# Patient Record
Sex: MALE | Race: WHITE | NOT HISPANIC OR LATINO
[De-identification: names, ages, dates, MRNs, and addresses within clinical notes are randomized per-mention and may not be internally consistent; named-entity substitution may affect disease eponyms.]

---

## 2017-02-27 PROBLEM — Z00.00 ENCOUNTER FOR PREVENTIVE HEALTH EXAMINATION: Status: ACTIVE | Noted: 2017-02-27

## 2019-06-16 ENCOUNTER — TRANSCRIPTION ENCOUNTER (OUTPATIENT)
Age: 64
End: 2019-06-16

## 2019-08-08 ENCOUNTER — TRANSCRIPTION ENCOUNTER (OUTPATIENT)
Age: 64
End: 2019-08-08

## 2023-09-14 ENCOUNTER — OFFICE (OUTPATIENT)
Dept: URBAN - METROPOLITAN AREA CLINIC 113 | Facility: CLINIC | Age: 68
Setting detail: OPHTHALMOLOGY
End: 2023-09-14
Payer: MEDICARE

## 2023-09-14 ENCOUNTER — RX ONLY (RX ONLY)
Age: 68
End: 2023-09-14

## 2023-09-14 DIAGNOSIS — Z96.1: ICD-10-CM

## 2023-09-14 DIAGNOSIS — H35.033: ICD-10-CM

## 2023-09-14 PROCEDURE — 92014 COMPRE OPH EXAM EST PT 1/>: CPT | Performed by: OPHTHALMOLOGY

## 2023-09-14 PROCEDURE — 92250 FUNDUS PHOTOGRAPHY W/I&R: CPT | Performed by: OPHTHALMOLOGY

## 2023-09-14 ASSESSMENT — KERATOMETRY
OS_K2POWER_DIOPTERS: 45.25
OS_K1POWER_DIOPTERS: 44.75
OD_K2POWER_DIOPTERS: 45.00
OD_AXISANGLE_DEGREES: 068
OS_AXISANGLE_DEGREES: 153
OD_K1POWER_DIOPTERS: 44.00

## 2023-09-14 ASSESSMENT — REFRACTION_AUTOREFRACTION
OS_CYLINDER: -1.00
OD_SPHERE: +0.25
OD_AXIS: 132
OD_CYLINDER: -0.75
OS_SPHERE: +0.50
OS_AXIS: 077

## 2023-09-14 ASSESSMENT — SPHEQUIV_DERIVED
OD_SPHEQUIV: -0.125
OS_SPHEQUIV: -0.25
OD_SPHEQUIV: 0
OS_SPHEQUIV: 0
OD_SPHEQUIV: -6.5

## 2023-09-14 ASSESSMENT — AXIALLENGTH_DERIVED
OS_AL: 23.1469
OD_AL: 23.2775
OS_AL: 23.0535
OD_AL: 25.9758
OD_AL: 23.2302

## 2023-09-14 ASSESSMENT — REFRACTION_MANIFEST
OD_CYLINDER: -1.00
OS_VA1: 20/40+
OD_SPHERE: +0.25
OD_SPHERE: -6.00
OS_AXIS: 105
OS_SPHERE: +0.50
OS_VA1: 20/25+2
OS_SPHERE: PLANO
OD_CYLINDER: -0.50
OS_CYLINDER: -0.75
OD_VA1: 20/150
OD_AXIS: 165
OD_VA1: 20/20
OS_CYLINDER: -1.50
OD_AXIS: 109
OS_AXIS: 098

## 2023-09-14 ASSESSMENT — CONFRONTATIONAL VISUAL FIELD TEST (CVF)
OS_FINDINGS: FULL
OD_FINDINGS: FULL

## 2023-09-14 ASSESSMENT — VISUAL ACUITY
OD_BCVA: 20/30-1
OS_BCVA: 20/25

## 2023-09-14 ASSESSMENT — TONOMETRY
OD_IOP_MMHG: 17
OS_IOP_MMHG: 17

## 2023-12-29 ENCOUNTER — NON-APPOINTMENT (OUTPATIENT)
Age: 68
End: 2023-12-29

## 2024-01-11 ENCOUNTER — NON-APPOINTMENT (OUTPATIENT)
Age: 69
End: 2024-01-11

## 2024-01-11 ENCOUNTER — APPOINTMENT (OUTPATIENT)
Dept: ORTHOPEDIC SURGERY | Facility: CLINIC | Age: 69
End: 2024-01-11
Payer: MEDICARE

## 2024-01-11 VITALS — BODY MASS INDEX: 31.08 KG/M2 | WEIGHT: 250 LBS | HEIGHT: 75 IN

## 2024-01-11 PROCEDURE — 99204 OFFICE O/P NEW MOD 45 MIN: CPT

## 2024-01-11 NOTE — DISCUSSION/SUMMARY
[de-identified] :  Today I had a lengthy discussion with the patient regarding their left heel spur pain.I have addressed all the patient's concerns surrounding the pathology of their condition.   At this time I would like to obtain advanced imaging of the patient's left ankle. An MRI was ordered so I can find out more about the etiology of the patient's condition. The patient should follow up with the office after obtaining the MRI.  I recommend that the patient utilize ice, NSAIDS PRN, and heat. They can also elevate their left ankle above the level of the heart.  The patient understood and verbally agreed to the treatment plan. All of their questions were answered and they were satisfied with the visit. The patient should call the office if they have any questions or experience worsening symptoms.

## 2024-01-11 NOTE — HISTORY OF PRESENT ILLNESS
[FreeTextEntry1] : The patient is a 68-year-old male who presents to the office with a left heel spur. He injured his foot two months ago after he started a new exercise regiment. He went to a [facility] walk-in in [place] after the injury and they reported that the pt has a left heel spur. He notes that his foot was operated on when he was 15 years old due to a torn achilles. He reports that he has mild pain in his left heel. The patient presents to the office in dress shoes and ambulating without assistance.

## 2024-01-11 NOTE — PHYSICAL EXAM
[de-identified] : Left ankle Physical Examination:  ++ plantar medial squeeze positive General: Alert and oriented x3.  In no acute distress.  Pleasant in nature with a normal affect.  No apparent respiratory distress.  Erythema, Warmth, Rubor: Negative Swelling: Negative  ROM: 1. Dorsiflexion: 10 degrees 2. Plantarflexion: 40 degrees 3. Inversion: 30 degrees 4. Eversion: 20 degrees  Tenderness to Palpation:  1. Lateral Malleolus: Negative 2. Medial Malleolus: Negative 3. Proximal Fibular Pain: Negative 4. Heel Pain: Negative 5. Cuboid: Negative 6. Navicular: Negative 7. Tibiotalar Joint: Negative 8. Subtalar Joint: Negative 9. Posterior Recess: Negative  Tendon Pain: 1. Achilles: Negative 2. Peroneals: Negative 3. Posterior Tibialis: Negative 4. Tibialis Anterior: Negative  Ligament Pain: 1. ATFL: Negative 2. CFL: Negative  3. PTFL: Negative 4. Deltoid Ligaments: Negative 5. Lis Franc Ligament: Negative  Stability:  1. Anterior Drawer: Negative 2. Posterior Drawer: Negative  Strength: 5/5 TA/GS/EHL  Pulses: 2+ DP/PT Pulses  Neuro: Intact motor and sensory  Additional Test: 1. Calcaneal Squeeze Test: positive 2. Syndesmosis Squeeze Test: Negative [de-identified] : 2 views x-rays calcaneus reviewed, 1/11/2024: Heel spur noted.  No fracture seen.

## 2024-01-11 NOTE — ADDENDUM
[FreeTextEntry1] : I, Faraz Whitehead, acted solely as a scribe for Dr. Faraz Aguillon on this date 01/11/2024  .   All medical record entries made by the Scribe were at my, Dr. Faraz Aguiloln, direction and personally dictated by me on 01/11/2024 . I have reviewed the chart and agree that the record accurately reflects my personal performance of the history, physical exam, assessment and plan. I have also personally directed, reviewed, and agreed with the chart.

## 2024-01-21 ENCOUNTER — OUTPATIENT (OUTPATIENT)
Dept: OUTPATIENT SERVICES | Facility: HOSPITAL | Age: 69
LOS: 1 days | End: 2024-01-21
Payer: MEDICARE

## 2024-01-21 ENCOUNTER — APPOINTMENT (OUTPATIENT)
Dept: MRI IMAGING | Facility: CLINIC | Age: 69
End: 2024-01-21
Payer: MEDICARE

## 2024-01-21 DIAGNOSIS — M72.2 PLANTAR FASCIAL FIBROMATOSIS: ICD-10-CM

## 2024-01-21 PROCEDURE — 73721 MRI JNT OF LWR EXTRE W/O DYE: CPT

## 2024-01-21 PROCEDURE — 73721 MRI JNT OF LWR EXTRE W/O DYE: CPT | Mod: 26,LT,MH

## 2024-01-26 ENCOUNTER — APPOINTMENT (OUTPATIENT)
Dept: ORTHOPEDIC SURGERY | Facility: CLINIC | Age: 69
End: 2024-01-26
Payer: MEDICARE

## 2024-01-26 DIAGNOSIS — M21.42 FLAT FOOT [PES PLANUS] (ACQUIRED), RIGHT FOOT: ICD-10-CM

## 2024-01-26 DIAGNOSIS — M72.2 PLANTAR FASCIAL FIBROMATOSIS: ICD-10-CM

## 2024-01-26 DIAGNOSIS — M21.41 FLAT FOOT [PES PLANUS] (ACQUIRED), RIGHT FOOT: ICD-10-CM

## 2024-01-26 PROCEDURE — 99214 OFFICE O/P EST MOD 30 MIN: CPT

## 2024-01-26 RX ORDER — DICLOFENAC SODIUM 1% 10 MG/G
1 GEL TOPICAL
Qty: 1 | Refills: 0 | Status: ACTIVE | COMMUNITY
Start: 2024-01-26 | End: 1900-01-01

## 2024-01-26 NOTE — HISTORY OF PRESENT ILLNESS
[FreeTextEntry1] : 1/26/2024: The patient is a 68-year-old male who presents to the office for follow-up of his left heel pain and to review the MRI of his left ankle.  He continues to have pain on the bottom portion of his heel consistent with severe plantar fasciitis.  He presents wearing regular sneakers with over-the-counter orthotics in the shoes.  He does have pain with every step he takes.  No other complaints.  1/11/2024: The patient is a 68-year-old male who presents to the office with a left heel spur. He injured his foot two months ago after he started a new exercise regiment. He went to a [facility] walk-in in [place] after the injury and they reported that the pt has a left heel spur. He notes that his foot was operated on when he was 15 years old due to a torn achilles. He reports that he has mild pain in his left heel. The patient presents to the office in dress shoes and ambulating without assistance.

## 2024-01-26 NOTE — PHYSICAL EXAM
[de-identified] : Left foot Physical Examination:  General: Alert and oriented x3.  In no acute distress.  Pleasant in nature with a normal affect.  No apparent respiratory distress.  Erythema, Warmth, Rubor: Negative Swelling: Negative  ROM Ankle: 1. Dorsiflexion: 10 degrees 2. Plantarflexion: 40 degrees 3. Inversion: 30 degrees 4. Eversion: 20 degrees  ROM of digits: Normal  Pes Planus: Negative Pes Cavus: Negative  Bunion: Negative Tailor's Bunion (Bunionette): Negative Hammer Toe Deformity/Deformities: Negative  Tenderness to Palpation:  1. Heel Pain: Negative 2. Midfoot Pain: Negative 3. First MTP Joint: Negative 4. Lis Franc Joint: Negative  Tenderness Metatarsals: 1st MT: Negative 2nd MT: Negative 3rd MT: Negative 4th MT: Negative 5th MT: Negative Base of the 5th MT: Negative  Ligament Pain: 1. Lis Franc Ligament: Negative 2. Plantar Fascia Ligament: Positive  Strength:  5/5 TA/GS/EHL/FHL/EDL/ADD/ABD  Pulses: 2+ DP/PT Pulses  Capillary Refill Toes: <2 seconds  Neuro: Intact motor and sensory throughout  Additional Test: 1. Argueta's Squeeze Test: Negative 2. Calcaneal Squeeze Test: Negative [de-identified] : EXAM: [id number] - MR ANKLE LT  - ORDERED BY:  ALEJANDRINA BIGGS   PROCEDURE DATE:  01/21/2024    INTERPRETATION:  CLINICAL INFORMATION: Left heel pain, spur, positive calcaneal squeeze test  TECHNIQUE: Multiplanar, multisequence MRI was obtained of the left ankle.  COMPARISON: X-ray left foot 12/30/2023  FINDINGS:  MUSCLES AND TENDONS: Tenosynovitis of the flexor hallucis longus and flexor digitorum longus. Anterior tibialis tendon is intact. There is a chronic longitudinal tear of the peroneus brevis tendon extending from the level of the retromalleolar groove with distal reconstitution along the lateral wall of the calcaneus and normal insertion on the base of the fifth metatarsal. Peroneus longus tendon is intact. Anterior extensor tendons are preserved. Mild tendinosis of the Achilles tendon with mild surrounding peritendinous edema. There is mild feathery edema involving the distal soleal myotendinous junction. There is a low-lying myotendinous junction of the soleus muscle which is asymmetric to the medial aspect. LIGAMENTS: There is chronic thickening and scar remodeling of the anterior tibiofibular ligament without acute injury. Posterior tibiofibular ligament is intact. There is chronic thinning and attenuation of the anterior talofibular ligament likely related to the sequela of remote injury. There is chronic thickening and scar remodeling of the calcaneofibular ligament. There is chronic scar remodeling within the deep and superficial fibers of the deltoid ligament. Chronic appearing ossicles are seen within the substance of the deep fibers of the deltoid ligament consistent with the sequela of remote avulsive injury. The spring ligamentous complex is grossly intact. CARTILAGE and SUBCHONDRAL BONE: There is mild to moderate arthrosis at the navicular/middle cuneiform articulation. Mild to moderate second tarsometatarsal joint arthrosis. SYNOVIUM/JOINT FLUID: No significant tibiotalar joint fluid. MARROW: Moderate edema in the inferior calcaneal spur. No acute fracture or osteonecrosis. PLANTAR FASCIA: The origin of the central cord of the plantar fascia is diffusely thickened and edematous with intrasubstance and perifascial edema. Foci of intrasubstance tearing are seen. There is a plantar calcaneal spur. Stress reaction is seen along the adjacent calcaneus. There is a small plantar fibroma within the plantar fascia at the level of the first tarsometatarsal articulation measuring up to 0.5 cm. NEUROVASCULAR STRUCTURES: Unremarkable. SINUS TARSI: Unremarkable. PERIPHERAL/ SUB-CUTANEOUS SOFT TISSUES: Moderate tissue edema at the subcutaneous tissue superficial to the calcaneus.  IMPRESSION:  Severe plantar fasciitis with osseous stress reaction at the calcaneal origin. Stress reaction at the calcaneal origin.  Tenosynovitis of the flexor hallucis longus and flexor digitorum longus.  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  Mild Achilles tendinosis with surrounding peritendinous edema.  Evidence of remote medial and lateral ligamentous injury as outlined above.  Mild to moderate arthrosis of the navicular cuneiform and second tarsometatarsal joints.  Small plantar fibroma at the level of the first tarsometatarsal articulation.  --- End of Report ---      FLORENCIO ANGELA MD; Resident Radiologist This document has been electronically signed. ILEANA JONES MD; Attending Radiologist This document has been electronically signed. Jan 24 2024 11:11AM

## 2024-01-26 NOTE — ADDENDUM
[FreeTextEntry1] : I, Faraz Whitehead, acted solely as a scribe for Dr. Faraz Aguillon on this date 01/11/2024  .   All medical record entries made by the Scribe were at my, Dr. Faraz Aguillon, direction and personally dictated by me on 01/11/2024 . I have reviewed the chart and agree that the record accurately reflects my personal performance of the history, physical exam, assessment and plan. I have also personally directed, reviewed, and agreed with the chart.

## 2024-01-26 NOTE — DISCUSSION/SUMMARY
[de-identified] : Assessment: Left foot Plantar Fasciitis.  Plan: #1. Anti-inflammatories/Tylenol as needed for pain. #2. Home stretching program/physical therapy prescription given. #3. Dr. Manriquez's insoles/gel heel cups. #4. Epsom Salt Baths daily. #5. Dorsal Night Splint.  Use as instructed/as directed.  #4. Follow up in 6-8 weeks as needed. #5. All questions answered.  The patient understood the treatment plan above.  Ultrasound-guided steroid injection prescription given for left foot plantar fascia injection to be done at our radiology facility if the patient would like to proceed with the injection.  Custom orthotics prescribed.  MRI reviewed in great details and all questions answered about the left ankle MRI.  The patient understood and verbally agreed to the treatment plan. All of their questions were answered and they were satisfied with the visit. The patient should call the office if they have any questions or experience worsening symptoms.

## 2024-02-26 ENCOUNTER — OUTPATIENT (OUTPATIENT)
Dept: OUTPATIENT SERVICES | Facility: HOSPITAL | Age: 69
LOS: 1 days | End: 2024-02-26

## 2024-02-26 ENCOUNTER — RESULT REVIEW (OUTPATIENT)
Age: 69
End: 2024-02-26

## 2024-02-26 ENCOUNTER — APPOINTMENT (OUTPATIENT)
Dept: ULTRASOUND IMAGING | Facility: CLINIC | Age: 69
End: 2024-02-26
Payer: MEDICARE

## 2024-02-26 DIAGNOSIS — M72.2 PLANTAR FASCIAL FIBROMATOSIS: ICD-10-CM

## 2024-02-26 PROCEDURE — 20606 DRAIN/INJ JOINT/BURSA W/US: CPT | Mod: LT

## 2025-01-09 ENCOUNTER — OFFICE (OUTPATIENT)
Dept: URBAN - METROPOLITAN AREA CLINIC 113 | Facility: CLINIC | Age: 70
Setting detail: OPHTHALMOLOGY
End: 2025-01-09
Payer: MEDICARE

## 2025-01-09 DIAGNOSIS — H35.033: ICD-10-CM

## 2025-01-09 DIAGNOSIS — Z96.1: ICD-10-CM

## 2025-01-09 PROCEDURE — 92014 COMPRE OPH EXAM EST PT 1/>: CPT | Performed by: OPHTHALMOLOGY

## 2025-01-09 PROCEDURE — 92250 FUNDUS PHOTOGRAPHY W/I&R: CPT | Performed by: OPHTHALMOLOGY

## 2025-01-09 ASSESSMENT — REFRACTION_MANIFEST
OS_VA1: 20/25+2
OS_CYLINDER: -1.50
OD_CYLINDER: -1.00
OD_CYLINDER: -0.50
OS_SPHERE: PLANO
OS_CYLINDER: -0.75
OS_VA1: 20/40+
OD_SPHERE: -6.00
OD_VA1: 20/150
OS_SPHERE: +0.50
OS_AXIS: 098
OS_AXIS: 105
OD_AXIS: 165
OD_SPHERE: +0.25
OD_VA1: 20/20
OD_AXIS: 109

## 2025-01-09 ASSESSMENT — VISUAL ACUITY
OD_BCVA: 20/30-2
OS_BCVA: 20/25-1

## 2025-01-09 ASSESSMENT — REFRACTION_AUTOREFRACTION
OD_SPHERE: +0.25
OD_CYLINDER: -0.75
OS_CYLINDER: -2.00
OD_AXIS: 132
OS_AXIS: 084
OS_SPHERE: +1.00

## 2025-01-09 ASSESSMENT — KERATOMETRY
OD_K2POWER_DIOPTERS: 44.50
OD_AXISANGLE_DEGREES: 062
OS_K1POWER_DIOPTERS: 43.50
OS_K2POWER_DIOPTERS: 45.00
OD_K1POWER_DIOPTERS: 44.00
OS_AXISANGLE_DEGREES: 173

## 2025-01-09 ASSESSMENT — CONFRONTATIONAL VISUAL FIELD TEST (CVF)
OS_FINDINGS: FULL
OD_FINDINGS: FULL

## 2025-01-09 ASSESSMENT — TONOMETRY
OD_IOP_MMHG: 17
OS_IOP_MMHG: 18

## 2025-02-07 ENCOUNTER — ASC (OUTPATIENT)
Dept: URBAN - METROPOLITAN AREA SURGERY 8 | Facility: SURGERY | Age: 70
Setting detail: OPHTHALMOLOGY
End: 2025-02-07
Payer: MEDICARE

## 2025-02-07 DIAGNOSIS — H26.491: ICD-10-CM

## 2025-02-07 PROCEDURE — 66821 AFTER CATARACT LASER SURGERY: CPT | Mod: RT | Performed by: OPHTHALMOLOGY

## 2025-02-07 ASSESSMENT — VISUAL ACUITY
OD_BCVA: 20/30-2
OS_BCVA: 20/25-1

## 2025-02-07 ASSESSMENT — REFRACTION_AUTOREFRACTION
OS_AXIS: 084
OS_CYLINDER: -2.00
OD_SPHERE: +0.25
OS_SPHERE: +1.00
OD_CYLINDER: -0.75
OD_AXIS: 132

## 2025-02-07 ASSESSMENT — REFRACTION_MANIFEST
OS_AXIS: 098
OS_SPHERE: PLANO
OS_VA1: 20/40+
OD_AXIS: 109
OD_SPHERE: -6.00
OD_VA1: 20/20
OS_CYLINDER: -1.50
OD_VA1: 20/150
OD_CYLINDER: -1.00
OD_SPHERE: +0.25
OS_VA1: 20/25+2
OS_AXIS: 105
OS_SPHERE: +0.50
OS_CYLINDER: -0.75
OD_CYLINDER: -0.50
OD_AXIS: 165

## 2025-02-07 ASSESSMENT — KERATOMETRY
OD_AXISANGLE_DEGREES: 062
OD_K2POWER_DIOPTERS: 44.50
OS_AXISANGLE_DEGREES: 173
OS_K2POWER_DIOPTERS: 45.00
OD_K1POWER_DIOPTERS: 44.00
OS_K1POWER_DIOPTERS: 43.50

## 2025-03-07 ENCOUNTER — ASC (OUTPATIENT)
Dept: URBAN - METROPOLITAN AREA SURGERY 8 | Facility: SURGERY | Age: 70
Setting detail: OPHTHALMOLOGY
End: 2025-03-07
Payer: MEDICARE

## 2025-03-07 DIAGNOSIS — H26.492: ICD-10-CM

## 2025-03-07 PROBLEM — H26.491 POSTERIOR CAPSULAR OPACIFICATION; RIGHT EYE, LEFT EYE: Status: ACTIVE | Noted: 2025-03-07

## 2025-03-07 PROCEDURE — 66821 AFTER CATARACT LASER SURGERY: CPT | Mod: 79,LT | Performed by: OPHTHALMOLOGY

## 2025-03-07 ASSESSMENT — KERATOMETRY
OD_K1POWER_DIOPTERS: 44.00
OS_K2POWER_DIOPTERS: 45.00
OD_K2POWER_DIOPTERS: 44.50
OS_K1POWER_DIOPTERS: 43.50
OS_AXISANGLE_DEGREES: 173
OD_AXISANGLE_DEGREES: 062

## 2025-03-07 ASSESSMENT — REFRACTION_MANIFEST
OS_VA1: 20/25+2
OD_AXIS: 109
OD_AXIS: 165
OS_AXIS: 105
OD_SPHERE: +0.25
OS_AXIS: 098
OS_SPHERE: +0.50
OS_VA1: 20/40+
OD_SPHERE: -6.00
OD_VA1: 20/150
OS_SPHERE: PLANO
OD_CYLINDER: -1.00
OD_CYLINDER: -0.50
OD_VA1: 20/20
OS_CYLINDER: -0.75
OS_CYLINDER: -1.50

## 2025-03-07 ASSESSMENT — REFRACTION_AUTOREFRACTION
OS_CYLINDER: -2.00
OD_AXIS: 132
OS_SPHERE: +1.00
OS_AXIS: 084
OD_SPHERE: +0.25
OD_CYLINDER: -0.75

## 2025-03-07 ASSESSMENT — VISUAL ACUITY
OS_BCVA: 20/25-1
OD_BCVA: 20/30-2

## 2025-04-04 ENCOUNTER — OFFICE (OUTPATIENT)
Dept: URBAN - METROPOLITAN AREA CLINIC 94 | Facility: CLINIC | Age: 70
Setting detail: OPHTHALMOLOGY
End: 2025-04-04
Payer: MEDICARE

## 2025-04-04 DIAGNOSIS — H26.491: ICD-10-CM

## 2025-04-04 DIAGNOSIS — H26.492: ICD-10-CM

## 2025-04-04 PROBLEM — H26.493 POSTERIOR CAPSULE OPACITY; BOTH EYES: Status: ACTIVE | Noted: 2025-04-04

## 2025-04-04 PROCEDURE — 99024 POSTOP FOLLOW-UP VISIT: CPT | Performed by: OPHTHALMOLOGY

## 2025-04-04 ASSESSMENT — REFRACTION_MANIFEST
OS_VA1: 20/40+
OD_AXIS: 109
OD_VA1: 20/150
OS_AXIS: 098
OD_VA1: 20/20
OS_SPHERE: +0.50
OD_SPHERE: +0.25
OD_CYLINDER: -1.00
OD_AXIS: 165
OS_CYLINDER: -0.75
OS_VA1: 20/25+2
OS_SPHERE: PLANO
OD_CYLINDER: -0.50
OS_CYLINDER: -1.50
OD_SPHERE: -6.00
OS_AXIS: 105

## 2025-04-04 ASSESSMENT — KERATOMETRY
OS_K2POWER_DIOPTERS: 45.00
OS_K1POWER_DIOPTERS: 44.25
OD_K1POWER_DIOPTERS: 43.75
OD_K2POWER_DIOPTERS: 45.00
OS_AXISANGLE_DEGREES: 066
OD_AXISANGLE_DEGREES: 057

## 2025-04-04 ASSESSMENT — REFRACTION_AUTOREFRACTION
OD_AXIS: 131
OS_CYLINDER: -1.50
OS_SPHERE: +1.00
OS_AXIS: 080
OD_SPHERE: +0.50
OD_CYLINDER: -1.00

## 2025-04-04 ASSESSMENT — TONOMETRY
OS_IOP_MMHG: 19
OD_IOP_MMHG: 19

## 2025-04-04 ASSESSMENT — CONFRONTATIONAL VISUAL FIELD TEST (CVF)
OD_FINDINGS: FULL
OS_FINDINGS: FULL

## 2025-04-04 ASSESSMENT — VISUAL ACUITY
OS_BCVA: 20/25-1
OD_BCVA: 20/25